# Patient Record
Sex: MALE | Race: WHITE | Employment: OTHER | URBAN - METROPOLITAN AREA
[De-identification: names, ages, dates, MRNs, and addresses within clinical notes are randomized per-mention and may not be internally consistent; named-entity substitution may affect disease eponyms.]

---

## 2018-11-09 ENCOUNTER — HOSPITAL ENCOUNTER (OUTPATIENT)
Age: 33
Discharge: HOME | End: 2018-11-09
Payer: COMMERCIAL

## 2018-11-09 VITALS
HEART RATE: 92 BPM | SYSTOLIC BLOOD PRESSURE: 143 MMHG | OXYGEN SATURATION: 97 % | HEIGHT: 73.5 IN | DIASTOLIC BLOOD PRESSURE: 95 MMHG | WEIGHT: 207 LBS | TEMPERATURE: 99 F | BODY MASS INDEX: 26.85 KG/M2 | RESPIRATION RATE: 14 BRPM

## 2018-11-09 DIAGNOSIS — Z48.02 ENCOUNTER FOR STAPLE REMOVAL: Primary | ICD-10-CM

## 2018-11-09 DIAGNOSIS — S01.01XA SCALP LACERATION, INITIAL ENCOUNTER: ICD-10-CM

## 2018-11-09 PROCEDURE — 99201 HC OUTPT EVAL AND MGNT NEW PT LEVEL 1: CPT

## 2018-11-09 PROCEDURE — 99201 PR OUTPT EVAL AND MGNT NEW PT LEVEL 1: CPT

## 2018-11-09 NOTE — ED INITIAL ASSESSMENT (HPI)
Pt fell in his basement 2 weeks ago and was taken by ambulance to [facility] ER. Patient had 3 staples put in his head. No issues or complaints.

## 2018-11-09 NOTE — ED PROVIDER NOTES
Patient Seen in: [id number] [facility]    History   Patient presents with:  Kelvin Fish (ingtegumentary)    Stated Complaint: staple removal from head    HPI    CHIEF COMPLAINT: Staple removal    HISTORY OF PRESENT ILLNESS: Patient is noted in HPI. Constitutional and vital signs reviewed. All other systems reviewed and negative except as noted above.     Physical Exam     ED Triage Vitals [11/09/18 1201]   BP (!) 143/95   Pulse 92   Resp 14   Temp 98.6 °F (37 °C)   Temp src Tempora

## 2019-02-21 NOTE — ED INITIAL ASSESSMENT (HPI)
Pt states this am he went to a rehab center to stop drinking alcohol. States he last drank last night. This am he is nervous and anxious and the staff got a series of ovi BP readings. Pt sent for medical intervention of high BP.  Denies  Any headache, too

## 2019-02-21 NOTE — ED PROVIDER NOTES
Patient Seen in: [address] [facility]    History   Patient presents with:  Hypertension (cardiovascular)    Stated Complaint: bp elevated     HPI    18-year-old male presents to the immediate care today with chief complaints of alcohol withdra Current:BP (!) 184/100   Pulse 106   Temp 98.3 °F (36.8 °C) (Temporal)   Resp 20   Ht 188 cm (6' 2\")   Wt 102.1 kg   SpO2 97%   BMI 28.89 kg/m²         Physical Exam    General appearance: alert, appears very anxious and jittery. No diaphoresis.   H diagnosis)  Blood pressure elevated without history of HTN    Disposition:   Ic to ed  2/21/2019 11:48 am    Follow-up:  [facility]  [address] [facility] [address]. [id number] [phone]  Go today  for higher level of care and fu

## 2020-09-08 ENCOUNTER — APPOINTMENT (OUTPATIENT)
Dept: GENERAL RADIOLOGY | Age: 35
End: 2020-09-08
Attending: PHYSICIAN ASSISTANT
Payer: COMMERCIAL

## 2020-09-08 ENCOUNTER — HOSPITAL ENCOUNTER (OUTPATIENT)
Age: 35
Discharge: HOME | End: 2020-09-08
Payer: COMMERCIAL

## 2020-09-08 VITALS
TEMPERATURE: 98 F | RESPIRATION RATE: 16 BRPM | HEART RATE: 102 BPM | SYSTOLIC BLOOD PRESSURE: 170 MMHG | DIASTOLIC BLOOD PRESSURE: 96 MMHG | OXYGEN SATURATION: 97 %

## 2020-09-08 DIAGNOSIS — S99.922A INJURY OF LEFT HEEL, INITIAL ENCOUNTER: Primary | ICD-10-CM

## 2020-09-08 DIAGNOSIS — S92.002A CLOSED NONDISPLACED FRACTURE OF LEFT CALCANEUS, UNSPECIFIED PORTION OF CALCANEUS, INITIAL ENCOUNTER: ICD-10-CM

## 2020-09-08 PROCEDURE — 73650 X-RAY EXAM OF HEEL: CPT | Performed by: PHYSICIAN ASSISTANT

## 2020-09-08 PROCEDURE — 99213 OFFICE O/P EST LOW 20 MIN: CPT | Performed by: PHYSICIAN ASSISTANT

## 2020-09-08 RX ORDER — NAPROXEN 500 MG/1
500 TABLET ORAL 2 TIMES DAILY PRN
Qty: 20 TABLET | Refills: 0 | Status: SHIPPED | OUTPATIENT
Start: 2020-09-08 | End: 2020-09-15

## 2020-09-08 NOTE — ED PROVIDER NOTES
Patient Seen in: [id number] [facility] [place]      History   Patient presents with:  Lower Extremity Injury    Stated Complaint: R. Foot Injury    HPI    Pleasant 25-year-old male. 2 days prior to arrival, patient was playing ultimate Frisbee.   He had motion  Skin: Skin warm and dry, no induration or sign of infection. Neuro: Cranial nerves intact.     ED Course   Labs Reviewed - No data to display      Xr Heel (calcaneus) (min 2 Views), Right (cpt=73650)    Result Date: 9/8/2020  PROCEDURE:  XR HEEL ( Prescribed:  Current Discharge Medication List    START taking these medications    naproxen 500 MG Oral Tab  Take 1 tablet (500 mg total) by mouth 2 (two) times daily as needed.   Qty: 20 tablet Refills: 0

## 2020-09-08 NOTE — ED INITIAL ASSESSMENT (HPI)
Pt sts hit right heel on edge of driveway on Sunday night while playing frisbee. Unable to fully weight bear. Using own crutches.

## 2020-09-17 ENCOUNTER — HOSPITAL ENCOUNTER (OUTPATIENT)
Dept: GENERAL RADIOLOGY | Facility: HOSPITAL | Age: 35
Discharge: HOME | End: 2020-09-17
Attending: PODIATRIST
Payer: COMMERCIAL

## 2020-09-17 ENCOUNTER — OFFICE VISIT (OUTPATIENT)
Dept: PODIATRY CLINIC | Facility: CLINIC | Age: 35
End: 2020-09-17
Payer: COMMERCIAL

## 2020-09-17 VITALS — HEIGHT: 74 IN | WEIGHT: 225 LBS | BODY MASS INDEX: 28.88 KG/M2

## 2020-09-17 DIAGNOSIS — S92.024A CLOSED NONDISPLACED FRACTURE OF ANTERIOR PROCESS OF RIGHT CALCANEUS, INITIAL ENCOUNTER: ICD-10-CM

## 2020-09-17 DIAGNOSIS — S92.024A CLOSED NONDISPLACED FRACTURE OF ANTERIOR PROCESS OF RIGHT CALCANEUS, INITIAL ENCOUNTER: Primary | ICD-10-CM

## 2020-09-17 DIAGNOSIS — S91.331A PUNCTURE WOUND OF RIGHT FOOT, INITIAL ENCOUNTER: ICD-10-CM

## 2020-09-17 PROCEDURE — 3008F BODY MASS INDEX DOCD: CPT | Performed by: PODIATRIST

## 2020-09-17 PROCEDURE — 99212 OFFICE O/P EST SF 10 MIN: CPT | Performed by: PODIATRIST

## 2020-09-17 PROCEDURE — 73630 X-RAY EXAM OF FOOT: CPT | Performed by: PODIATRIST

## 2020-09-17 PROCEDURE — 99203 OFFICE O/P NEW LOW 30 MIN: CPT | Performed by: PODIATRIST

## 2020-09-17 NOTE — PROGRESS NOTES
Joesph Burris is a 38year old male.  Patient presents with:  Consult  Heel Pain: pt in for Right heel fracture on 9/6, was seen in [facility] on 9/8, denies any pain        HPI:   This pleasant gentleman presents to the clinic with a chief complaint of a painful 225 lb (102.1 kg)   BMI 28.89 kg/m²   GENERAL: well developed, well nourished, in no apparent distress  EXTREMITIES:   1.  Integument: The skin on his right foot was evaluated is warm and dry turgor is somewhat decreased around the lateral foot mild erythem

## 2020-10-08 ENCOUNTER — HOSPITAL ENCOUNTER (OUTPATIENT)
Dept: GENERAL RADIOLOGY | Facility: HOSPITAL | Age: 35
Discharge: HOME | End: 2020-10-08
Attending: PODIATRIST
Payer: COMMERCIAL

## 2020-10-08 ENCOUNTER — OFFICE VISIT (OUTPATIENT)
Dept: PODIATRY CLINIC | Facility: CLINIC | Age: 35
End: 2020-10-08
Payer: COMMERCIAL

## 2020-10-08 VITALS — WEIGHT: 230 LBS | BODY MASS INDEX: 30.48 KG/M2 | HEIGHT: 73 IN

## 2020-10-08 DIAGNOSIS — S91.331A PUNCTURE WOUND OF RIGHT FOOT, INITIAL ENCOUNTER: ICD-10-CM

## 2020-10-08 DIAGNOSIS — S92.024A CLOSED NONDISPLACED FRACTURE OF ANTERIOR PROCESS OF RIGHT CALCANEUS, INITIAL ENCOUNTER: Primary | ICD-10-CM

## 2020-10-08 DIAGNOSIS — S92.024A CLOSED NONDISPLACED FRACTURE OF ANTERIOR PROCESS OF RIGHT CALCANEUS, INITIAL ENCOUNTER: ICD-10-CM

## 2020-10-08 PROCEDURE — 99213 OFFICE O/P EST LOW 20 MIN: CPT | Performed by: PODIATRIST

## 2020-10-08 PROCEDURE — 73630 X-RAY EXAM OF FOOT: CPT | Performed by: PODIATRIST

## 2020-10-08 PROCEDURE — 99212 OFFICE O/P EST SF 10 MIN: CPT | Performed by: PODIATRIST

## 2020-10-08 PROCEDURE — 3008F BODY MASS INDEX DOCD: CPT | Performed by: PODIATRIST

## 2020-10-08 NOTE — PROGRESS NOTES
Deloise Snellen is a 38year old male. Patient presents with:   Foot Pain: follow up heel fx        HPI:   Patient returns to the clinic to follow-up on his heel fracture at today's visit reviewed nurse's history as taken above, allergies medications and medi visit:    Closed nondisplaced fracture of anterior process of right calcaneus, initial encounter  -     XR FOOT, COMPLETE (MIN 3 VIEWS), RIGHT (CPT=73630);  Future    Puncture wound of right foot, initial encounter  -     XR FOOT, COMPLETE (MIN 3 VIEWS), RI

## 2020-11-05 ENCOUNTER — HOSPITAL ENCOUNTER (OUTPATIENT)
Dept: GENERAL RADIOLOGY | Facility: HOSPITAL | Age: 35
Discharge: HOME | End: 2020-11-05
Attending: PODIATRIST
Payer: COMMERCIAL

## 2020-11-05 ENCOUNTER — OFFICE VISIT (OUTPATIENT)
Dept: PODIATRY CLINIC | Facility: CLINIC | Age: 35
End: 2020-11-05
Payer: COMMERCIAL

## 2020-11-05 VITALS — HEIGHT: 73 IN | BODY MASS INDEX: 30.48 KG/M2 | WEIGHT: 230 LBS

## 2020-11-05 DIAGNOSIS — S92.024A CLOSED NONDISPLACED FRACTURE OF ANTERIOR PROCESS OF RIGHT CALCANEUS, INITIAL ENCOUNTER: Primary | ICD-10-CM

## 2020-11-05 DIAGNOSIS — S92.024A CLOSED NONDISPLACED FRACTURE OF ANTERIOR PROCESS OF RIGHT CALCANEUS, INITIAL ENCOUNTER: ICD-10-CM

## 2020-11-05 PROCEDURE — 73630 X-RAY EXAM OF FOOT: CPT | Performed by: PODIATRIST

## 2020-11-05 PROCEDURE — 99213 OFFICE O/P EST LOW 20 MIN: CPT | Performed by: PODIATRIST

## 2020-11-05 PROCEDURE — 99212 OFFICE O/P EST SF 10 MIN: CPT | Performed by: PODIATRIST

## 2020-11-05 PROCEDURE — 3008F BODY MASS INDEX DOCD: CPT | Performed by: PODIATRIST

## 2020-11-05 NOTE — PROGRESS NOTES
Jami Wheeler is a 38year old male. Patient presents with: Follow - Up: Right Heel fracture. per patient doing well. states on and off aches. Denies any pain at this time.         HPI:   Patient returns to clinic still having some discomfort aching on and of the calcaneus posteriorly the anterior lateral process where he had his fracture was palpated its not too severe  X-rays were taken 3 views showing that the fracture site has healed fairly well.     ASSESSMENT AND PLAN:   Diagnoses and all orders for [facility]

## 2020-12-08 ENCOUNTER — OFFICE VISIT (OUTPATIENT)
Dept: PHYSICAL THERAPY | Age: 35
End: 2020-12-08
Attending: PODIATRIST
Payer: COMMERCIAL

## 2020-12-08 DIAGNOSIS — S92.024A CLOSED NONDISPLACED FRACTURE OF ANTERIOR PROCESS OF RIGHT CALCANEUS, INITIAL ENCOUNTER: ICD-10-CM

## 2020-12-08 PROCEDURE — 97140 MANUAL THERAPY 1/> REGIONS: CPT

## 2020-12-08 PROCEDURE — 97161 PT EVAL LOW COMPLEX 20 MIN: CPT

## 2020-12-09 ENCOUNTER — TELEPHONE (OUTPATIENT)
Dept: PHYSICAL THERAPY | Facility: HOSPITAL | Age: 35
End: 2020-12-09

## 2020-12-10 ENCOUNTER — OFFICE VISIT (OUTPATIENT)
Dept: PHYSICAL THERAPY | Age: 35
End: 2020-12-10
Attending: PODIATRIST
Payer: COMMERCIAL

## 2020-12-10 PROCEDURE — 97110 THERAPEUTIC EXERCISES: CPT

## 2020-12-10 PROCEDURE — 97140 MANUAL THERAPY 1/> REGIONS: CPT

## 2020-12-11 NOTE — PROGRESS NOTES
Dx:  Closed nondisplaced fracture of anterior process of right calcaneus       Authorized # of Visits:  No prior authorization is required per appointment notes          Next MD visit: none scheduled  Fall Risk: standard           Precautions: n/a

## 2020-12-16 ENCOUNTER — OFFICE VISIT (OUTPATIENT)
Dept: PHYSICAL THERAPY | Age: 35
End: 2020-12-16
Attending: PODIATRIST
Payer: COMMERCIAL

## 2020-12-16 PROCEDURE — 97110 THERAPEUTIC EXERCISES: CPT

## 2020-12-16 PROCEDURE — 97140 MANUAL THERAPY 1/> REGIONS: CPT

## 2020-12-18 ENCOUNTER — OFFICE VISIT (OUTPATIENT)
Dept: PHYSICAL THERAPY | Age: 35
End: 2020-12-18
Attending: PODIATRIST
Payer: COMMERCIAL

## 2020-12-18 PROCEDURE — 97112 NEUROMUSCULAR REEDUCATION: CPT

## 2020-12-18 PROCEDURE — 97140 MANUAL THERAPY 1/> REGIONS: CPT

## 2020-12-18 PROCEDURE — 97110 THERAPEUTIC EXERCISES: CPT

## 2020-12-18 NOTE — PROGRESS NOTES
Dx:  Closed nondisplaced fracture of anterior process of right calcaneus       Authorized # of Visits:  No prior authorization is required per appointment notes          Next MD visit: none scheduled  Fall Risk: standard           Precautions: n/a -Fwd/backward tandem walk 20 feet x 5     -Mindful walking with initial contact @ rear foot verse forefoot          Charges:   Manual Therapy x 1  Therapeutic excercise x 1     Neuromuscular re-education x 1     Total Timed Treatment: 40 min    Total Treat

## 2020-12-23 ENCOUNTER — OFFICE VISIT (OUTPATIENT)
Dept: PHYSICAL THERAPY | Age: 35
End: 2020-12-23
Attending: PODIATRIST
Payer: COMMERCIAL

## 2020-12-23 PROCEDURE — 97112 NEUROMUSCULAR REEDUCATION: CPT

## 2020-12-23 PROCEDURE — 97110 THERAPEUTIC EXERCISES: CPT

## 2020-12-23 PROCEDURE — 97140 MANUAL THERAPY 1/> REGIONS: CPT

## 2020-12-23 NOTE — PROGRESS NOTES
Dx:  Closed nondisplaced fracture of anterior process of right calcaneus       Authorized # of Visits:  No prior authorization is required per appointment notes          Next MD visit: none scheduled  Fall Risk: standard           Precautions: n/a mobilization x 20 -90/90 neural mobilization x 20 -90/90 neural mobilization x 20   -C/R and passive stretch posterior compartment musculature -C/R and passive stretch posterior compartment musculature -C/R and passive stretch posterior compartment muscula

## 2020-12-28 ENCOUNTER — OFFICE VISIT (OUTPATIENT)
Dept: PHYSICAL THERAPY | Age: 35
End: 2020-12-28
Attending: PODIATRIST
Payer: COMMERCIAL

## 2020-12-28 PROCEDURE — 97110 THERAPEUTIC EXERCISES: CPT

## 2020-12-28 PROCEDURE — 97140 MANUAL THERAPY 1/> REGIONS: CPT

## 2020-12-28 PROCEDURE — 97112 NEUROMUSCULAR REEDUCATION: CPT

## 2020-12-28 NOTE — PROGRESS NOTES
Dx:  Closed nondisplaced fracture of anterior process of right calcaneus       Authorized # of Visits:  No prior authorization is required per appointment notes          Next MD visit: none scheduled  Fall Risk: standard           Precautions: n/a exercise and home program progression consisting of 4 way step with resistance band and forward/backward lunge  Date: 12/10/2020  Tx#:  2   Date: 12/16/2020  Tx#:  3   Date: 12/18/2020  Tx#:  4   Date: 12/23/2020  Tx#:  5   Date: 12/28/2020  Tx#:  6     -S -Fwd/backward tandem walk 20 feet x 5 -Lateral braiding distance  40 feet x 5 x 2 sets     -Mindful walking with initial contact @ rear foot verse forefoot -Reviewed; as tolerated.  -Reviewed self stretch of calf (standing); B heal raise; self stretch to pl

## 2020-12-30 ENCOUNTER — OFFICE VISIT (OUTPATIENT)
Dept: PHYSICAL THERAPY | Age: 35
End: 2020-12-30
Attending: PODIATRIST
Payer: COMMERCIAL

## 2020-12-30 PROCEDURE — 97140 MANUAL THERAPY 1/> REGIONS: CPT

## 2020-12-30 PROCEDURE — 97112 NEUROMUSCULAR REEDUCATION: CPT

## 2020-12-30 PROCEDURE — 97110 THERAPEUTIC EXERCISES: CPT

## 2020-12-30 NOTE — PROGRESS NOTES
Discharge Summary    Pt has attended 7 visits in Physical Therapy.   Dx:  Closed nondisplaced fracture of anterior process of right calcaneus       Authorized # of Visits:  No prior authorization is required per appointment notes          Next MD visit: no Date: 12/30/2020  Tx#:  7     -Stationary bike: 10 minutes level 1.0 -Stationary bike: 10 minutes level 1.0 -Stationary bike: 10 minutes level 1.0 -Stationary bike: 10 minutes level 1.0 -Stationary bike: 15 minutes level 2.0 -Stationary bike: 15 minutes le green resistance band x 12 reps each direction each LE x 2 sets - HEP     -Fwd/backward tandem walk 20 feet x 5 -Fwd/backward tandem walk 20 feet x 5 -Lateral braiding distance  40 feet x 5 x 2 sets -Tandem walk; lateral braiding progression to home progra

## 2021-01-04 ENCOUNTER — APPOINTMENT (OUTPATIENT)
Dept: PHYSICAL THERAPY | Age: 36
End: 2021-01-04
Attending: PODIATRIST
Payer: COMMERCIAL

## (undated) NOTE — LETTER
Patient Name: Tim Ahumada  YOB: 1985          MRN number:  [id number]  Date:  12/8/2020  Referring Physician:  Peace Wolfe         INITIAL EVALUATION:    Referring Physician: Dr. Cleo Francisco  Diagnosis:    Closed nondisplaced fracture of -Passive DF: 0 degrees; firm/capsular tissue stretch mild hypomobility@ talocrural and subtalar joints  Today’s Treatment and Response:  -Patient was seen for joint mobilization of the ankle/foot; soft tissue mobilization of PF; C/R stretch of gastroc/sole Electronically signed by therapist: Dc Erickson, PT